# Patient Record
Sex: FEMALE | Race: BLACK OR AFRICAN AMERICAN | NOT HISPANIC OR LATINO | Employment: PART TIME | URBAN - METROPOLITAN AREA
[De-identification: names, ages, dates, MRNs, and addresses within clinical notes are randomized per-mention and may not be internally consistent; named-entity substitution may affect disease eponyms.]

---

## 2020-02-12 ENCOUNTER — OFFICE VISIT (OUTPATIENT)
Dept: OBSTETRICS AND GYNECOLOGY | Facility: CLINIC | Age: 25
End: 2020-02-12
Payer: MEDICAID

## 2020-02-12 ENCOUNTER — LAB VISIT (OUTPATIENT)
Dept: LAB | Facility: HOSPITAL | Age: 25
End: 2020-02-12
Attending: ADVANCED PRACTICE MIDWIFE
Payer: MEDICAID

## 2020-02-12 VITALS
DIASTOLIC BLOOD PRESSURE: 70 MMHG | HEIGHT: 66 IN | SYSTOLIC BLOOD PRESSURE: 108 MMHG | BODY MASS INDEX: 19.53 KG/M2 | WEIGHT: 121.5 LBS

## 2020-02-12 DIAGNOSIS — Z87.59 HISTORY OF PLACENTAL ABRUPTION: ICD-10-CM

## 2020-02-12 DIAGNOSIS — Z32.01 POSITIVE PREGNANCY TEST: Primary | ICD-10-CM

## 2020-02-12 DIAGNOSIS — Z32.01 POSITIVE PREGNANCY TEST: ICD-10-CM

## 2020-02-12 LAB
BASOPHILS # BLD AUTO: 0.04 K/UL (ref 0–0.2)
BASOPHILS NFR BLD: 0.4 % (ref 0–1.9)
DIFFERENTIAL METHOD: NORMAL
EOSINOPHIL # BLD AUTO: 0.1 K/UL (ref 0–0.5)
EOSINOPHIL NFR BLD: 1.4 % (ref 0–8)
ERYTHROCYTE [DISTWIDTH] IN BLOOD BY AUTOMATED COUNT: 11.9 % (ref 11.5–14.5)
HCT VFR BLD AUTO: 38.7 % (ref 37–48.5)
HGB BLD-MCNC: 12.7 G/DL (ref 12–16)
IMM GRANULOCYTES # BLD AUTO: 0.03 K/UL (ref 0–0.04)
IMM GRANULOCYTES NFR BLD AUTO: 0.3 % (ref 0–0.5)
LYMPHOCYTES # BLD AUTO: 1.9 K/UL (ref 1–4.8)
LYMPHOCYTES NFR BLD: 18.7 % (ref 18–48)
MCH RBC QN AUTO: 29.7 PG (ref 27–31)
MCHC RBC AUTO-ENTMCNC: 32.8 G/DL (ref 32–36)
MCV RBC AUTO: 90 FL (ref 82–98)
MONOCYTES # BLD AUTO: 0.8 K/UL (ref 0.3–1)
MONOCYTES NFR BLD: 8 % (ref 4–15)
NEUTROPHILS # BLD AUTO: 7.1 K/UL (ref 1.8–7.7)
NEUTROPHILS NFR BLD: 71.2 % (ref 38–73)
NRBC BLD-RTO: 0 /100 WBC
PLATELET # BLD AUTO: 267 K/UL (ref 150–350)
PMV BLD AUTO: 10.6 FL (ref 9.2–12.9)
RBC # BLD AUTO: 4.28 M/UL (ref 4–5.4)
WBC # BLD AUTO: 10.01 K/UL (ref 3.9–12.7)

## 2020-02-12 PROCEDURE — 99203 OFFICE O/P NEW LOW 30 MIN: CPT | Mod: S$PBB,TH,, | Performed by: ADVANCED PRACTICE MIDWIFE

## 2020-02-12 PROCEDURE — 86780 TREPONEMA PALLIDUM: CPT

## 2020-02-12 PROCEDURE — 86593 SYPHILIS TEST NON-TREP QUANT: CPT

## 2020-02-12 PROCEDURE — 99202 OFFICE O/P NEW SF 15 MIN: CPT | Mod: PBBFAC,TH,25 | Performed by: ADVANCED PRACTICE MIDWIFE

## 2020-02-12 PROCEDURE — 99999 PR PBB SHADOW E&M-NEW PATIENT-LVL II: ICD-10-PCS | Mod: PBBFAC,,, | Performed by: ADVANCED PRACTICE MIDWIFE

## 2020-02-12 PROCEDURE — 85025 COMPLETE CBC W/AUTO DIFF WBC: CPT

## 2020-02-12 PROCEDURE — 86592 SYPHILIS TEST NON-TREP QUAL: CPT

## 2020-02-12 PROCEDURE — 86762 RUBELLA ANTIBODY: CPT

## 2020-02-12 PROCEDURE — 99999 PR PBB SHADOW E&M-NEW PATIENT-LVL II: CPT | Mod: PBBFAC,,, | Performed by: ADVANCED PRACTICE MIDWIFE

## 2020-02-12 PROCEDURE — 86703 HIV-1/HIV-2 1 RESULT ANTBDY: CPT

## 2020-02-12 PROCEDURE — 36415 COLL VENOUS BLD VENIPUNCTURE: CPT

## 2020-02-12 PROCEDURE — 87340 HEPATITIS B SURFACE AG IA: CPT

## 2020-02-12 PROCEDURE — 99203 PR OFFICE/OUTPT VISIT, NEW, LEVL III, 30-44 MIN: ICD-10-PCS | Mod: S$PBB,TH,, | Performed by: ADVANCED PRACTICE MIDWIFE

## 2020-02-12 PROCEDURE — 86850 RBC ANTIBODY SCREEN: CPT

## 2020-02-12 PROCEDURE — 87591 N.GONORRHOEAE DNA AMP PROB: CPT

## 2020-02-12 PROCEDURE — 88175 CYTOPATH C/V AUTO FLUID REDO: CPT

## 2020-02-12 RX ORDER — HYDROXYPROGESTERONE CAPROATE 250 MG/ML
250 INJECTION INTRAMUSCULAR
Qty: 5 ML | Refills: 6 | Status: SHIPPED | OUTPATIENT
Start: 2020-02-12

## 2020-02-12 NOTE — PROGRESS NOTES
CHIEF COMPLAINT:   Patient presents with      Possible Pregnancy        HISTORY OF PRESENT ILLNESS  Buck Ingram 24 y.o.  presents for pregnancy risk assessment.   The patient has no complaints today.  No nausea or vomiting. No bleeding or pain.  Pregnancy was not planned but is desired.  Partner is supportive of pregnancy.  Lives at home with children.  No pets at home.  Works at home.  Denies domestic abuse.  Denies chemical/pesticide/radiation exposure.  OB history:      LMP: Patient's last menstrual period was 2019.  EDC: Estimated Date of Delivery: 20  EGA: 13w      Health Maintenance   Topic Date Due    Lipid Panel  1994    Pap Smear  2018    TETANUS VACCINE  2026    HPV Vaccines  Completed       Past Medical History:   Diagnosis Date    Anemia     Disorder of kidney and ureter        History reviewed. No pertinent surgical history.    Family History   Problem Relation Age of Onset    Lupus Mother     Breast cancer Neg Hx     Cancer Neg Hx     Colon cancer Neg Hx     Diabetes Neg Hx     Eclampsia Neg Hx     Hypertension Neg Hx     Miscarriages / Stillbirths Neg Hx     Ovarian cancer Neg Hx      labor Neg Hx     Stroke Neg Hx        Social History     Socioeconomic History    Marital status: Single     Spouse name: Not on file    Number of children: Not on file    Years of education: Not on file    Highest education level: Not on file   Occupational History    Not on file   Social Needs    Financial resource strain: Not on file    Food insecurity:     Worry: Not on file     Inability: Not on file    Transportation needs:     Medical: Not on file     Non-medical: Not on file   Tobacco Use    Smoking status: Never Smoker    Smokeless tobacco: Never Used   Substance and Sexual Activity    Alcohol use: No    Drug use: No    Sexual activity: Yes     Partners: Male     Birth control/protection: None   Lifestyle    Physical  activity:     Days per week: Not on file     Minutes per session: Not on file    Stress: Not on file   Relationships    Social connections:     Talks on phone: Not on file     Gets together: Not on file     Attends Holiness service: Not on file     Active member of club or organization: Not on file     Attends meetings of clubs or organizations: Not on file     Relationship status: Not on file   Other Topics Concern    Not on file   Social History Narrative    Not on file       Current Outpatient Medications   Medication Sig Dispense Refill    hydroxyprogesterone caproate (17-ALPHA HYDROXYPROGESTERONE) injection Inject 1 mL (250 mg total) into the muscle every 7 days. 5 mL 6    PRENATAL #92-IRON-FA #8-PS-DHA ORAL Take by mouth.       No current facility-administered medications for this visit.        Review of patient's allergies indicates:  No Known Allergies      PHYSICAL EXAM   Vitals:    02/12/20 1425   BP: 108/70        PAIN SCALE: 0/10 None    PHYSICAL EXAM    ROS:  GENERAL: No fever, chills, fatigability or weight loss.  CV: Denies chest pain  PULM: Denies shortness of breath or wheezing.  ABDOMEN: Appetite fine. No weight loss. Denies diarrhea, abdominal pain, hematemesis or blood in stool.  URINARY: No flank pain, dysuria or hematuria.  REPRODUCTIVE: No abnormal vaginal bleeding.       PE:   APPEARANCE: Well nourished, well developed, in no acute distress  CHEST: Clear to auscultation bilaterally  CV: Regular rate and rhythm  BREASTS: Symmetrical, no skin changes or visible lesions. No palpable masses, nipple discharge or adenopathy bilaterally.  ABDOMEN: Soft. No tenderness or masses. No hepatosplenomegaly. No hernias  PELVIC:   VULVA: No lesions. Normal female genitalia.  URETHRAL MEATUS: Normal size and location, no lesions, no prolapse.  URETHRA: No masses, tenderness, prolapse or scarring.  VAGINA: Moist and well rugated, no discharge, no significant cystocele or rectocele.  CERVIX: No lesions,  normal diameter, no stenosis, no cervical motion tenderness.   UTERUS: 13size, regular shape, mobile, non-tender, normal position, good support.  ADNEXA: No masses, tenderness or CDS nodularity.  ANUS PERINEUM: No lesions, no relaxation, no external hemorrhoids.     UPT +     A/P:     -      Patient was counseled today on A.C.S. Pap guidelines and recommendations for yearly pelvic exams, mammograms and monthly self breast exams; to see her PCP for other health maintenance and pregnancy.   -      Patient's medications and medical history reviewed with patient and implications in pregnancy.   -      Pregnancy course discussed and 'AtoZ' book given. Patient was counseled on proper weight gain based on the [place] [employer]'s recommendations based on her pre-pregnancy weight. Discussed foods to avoid in pregnancy (i.e. sushi, fish that are high in mercury, deli meat, and unpasteurized cheeses). Discussed prenatal vitamin options (i.e. stool softener, DHA). Discussed potential medical problems in pregnancy.  -     Discussed risk of Toxoplasmosis transmission from pets and reviewed risk reduction techniques.    -     Chromosomal abnormality risk discussed and available testing offered - declined .   -     Pt was counseled on exercise in pregnancy and weight gain recommendations.  -     Pt was counseled on travel recommendations and on risks of Zika virus exposure.  Current CDC Zika advisories and prevention techniques were reviewed with pt.  Pt denies any recent international travel and does not plan travel during pregnancy.  Pt reports that partner does not plan travel either.  -     Oriented to practice including CNM collaboration.   -     Follow-up initial OB, with labs and u/s.   17 OHP ordered  sono ordered

## 2020-02-13 LAB
ABO + RH BLD: NORMAL
BLD GP AB SCN CELLS X3 SERPL QL: NORMAL

## 2020-02-14 LAB
HBV SURFACE AG SERPL QL IA: NEGATIVE
HIV 1+2 AB+HIV1 P24 AG SERPL QL IA: NEGATIVE
RPR SER QL: REACTIVE
RPR SER-TITR: ABNORMAL {TITER}
RUBV IGG SER-ACNC: 77.3 IU/ML
RUBV IGG SER-IMP: REACTIVE

## 2020-02-15 LAB
C TRACH DNA SPEC QL NAA+PROBE: NOT DETECTED
N GONORRHOEA DNA SPEC QL NAA+PROBE: NOT DETECTED

## 2020-02-17 ENCOUNTER — TELEPHONE (OUTPATIENT)
Dept: PHARMACY | Facility: CLINIC | Age: 25
End: 2020-02-17

## 2020-02-17 LAB — T PALLIDUM AB SER QL IF: REACTIVE

## 2020-02-17 NOTE — TELEPHONE ENCOUNTER
No answer/VM to inform patient that [facility] received prescription for Renee and benefits investigation is required.  [facility] will be back in touch once insurance determination is received.

## 2020-02-18 ENCOUNTER — INITIAL PRENATAL (OUTPATIENT)
Dept: OBSTETRICS AND GYNECOLOGY | Facility: CLINIC | Age: 25
End: 2020-02-18
Payer: MEDICAID

## 2020-02-18 ENCOUNTER — PROCEDURE VISIT (OUTPATIENT)
Dept: OBSTETRICS AND GYNECOLOGY | Facility: CLINIC | Age: 25
End: 2020-02-18
Payer: MEDICAID

## 2020-02-18 VITALS
BODY MASS INDEX: 19.32 KG/M2 | DIASTOLIC BLOOD PRESSURE: 60 MMHG | SYSTOLIC BLOOD PRESSURE: 110 MMHG | WEIGHT: 119.69 LBS

## 2020-02-18 DIAGNOSIS — Z32.01 POSITIVE PREGNANCY TEST: ICD-10-CM

## 2020-02-18 DIAGNOSIS — O09.91 SUPERVISION OF HIGH RISK PREGNANCY IN FIRST TRIMESTER: ICD-10-CM

## 2020-02-18 DIAGNOSIS — O09.899 HISTORY OF PRETERM DELIVERY, CURRENTLY PREGNANT: Primary | ICD-10-CM

## 2020-02-18 PROCEDURE — 76801 PR US, OB <14WKS, TRANSABD, SINGLE GESTATION: ICD-10-PCS | Mod: 26,S$PBB,, | Performed by: OBSTETRICS & GYNECOLOGY

## 2020-02-18 PROCEDURE — 99213 OFFICE O/P EST LOW 20 MIN: CPT | Mod: TH,S$PBB,, | Performed by: ADVANCED PRACTICE MIDWIFE

## 2020-02-18 PROCEDURE — 99213 PR OFFICE/OUTPT VISIT, EST, LEVL III, 20-29 MIN: ICD-10-PCS | Mod: TH,S$PBB,, | Performed by: ADVANCED PRACTICE MIDWIFE

## 2020-02-18 PROCEDURE — 99999 PR PBB SHADOW E&M-EST. PATIENT-LVL II: CPT | Mod: PBBFAC,,, | Performed by: ADVANCED PRACTICE MIDWIFE

## 2020-02-18 PROCEDURE — 99212 OFFICE O/P EST SF 10 MIN: CPT | Mod: PBBFAC,TH | Performed by: ADVANCED PRACTICE MIDWIFE

## 2020-02-18 PROCEDURE — 76801 OB US < 14 WKS SINGLE FETUS: CPT | Mod: 26,S$PBB,, | Performed by: OBSTETRICS & GYNECOLOGY

## 2020-02-18 PROCEDURE — 76801 OB US < 14 WKS SINGLE FETUS: CPT | Mod: PBBFAC | Performed by: OBSTETRICS & GYNECOLOGY

## 2020-02-18 PROCEDURE — 99999 PR PBB SHADOW E&M-EST. PATIENT-LVL II: ICD-10-PCS | Mod: PBBFAC,,, | Performed by: ADVANCED PRACTICE MIDWIFE

## 2020-02-18 RX ORDER — ONDANSETRON 4 MG/1
4 TABLET, FILM COATED ORAL DAILY PRN
Qty: 30 TABLET | Refills: 1 | Status: SHIPPED | OUTPATIENT
Start: 2020-02-18 | End: 2021-02-17
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
## 2020-03-12 LAB
FINAL PATHOLOGIC DIAGNOSIS: NORMAL
Lab: NORMAL

## 2020-03-14 DIAGNOSIS — O23.592 TRICHOMONAL VAGINITIS DURING PREGNANCY IN SECOND TRIMESTER: Primary | ICD-10-CM

## 2020-03-14 DIAGNOSIS — A59.01 TRICHOMONAL VAGINITIS DURING PREGNANCY IN SECOND TRIMESTER: Primary | ICD-10-CM

## 2020-03-14 RX ORDER — METRONIDAZOLE 500 MG/1
2000 TABLET ORAL ONCE
Qty: 4 TABLET | Refills: 0 | Status: SHIPPED | OUTPATIENT
Start: 2020-03-14 | End: 2020-03-14

## 2020-03-16 ENCOUNTER — TELEPHONE (OUTPATIENT)
Dept: OBSTETRICS AND GYNECOLOGY | Facility: CLINIC | Age: 25
End: 2020-03-16

## 2020-03-16 NOTE — TELEPHONE ENCOUNTER
Attempted to contact patient regarding her test results and interventions. No answer, unable to leave vm due to vm not set up. Will attempt later

## 2020-03-17 ENCOUNTER — TELEPHONE (OUTPATIENT)
Dept: OBSTETRICS AND GYNECOLOGY | Facility: CLINIC | Age: 25
End: 2020-03-17

## 2020-03-18 ENCOUNTER — TELEPHONE (OUTPATIENT)
Dept: OBSTETRICS AND GYNECOLOGY | Facility: CLINIC | Age: 25
End: 2020-03-18

## 2020-03-18 NOTE — TELEPHONE ENCOUNTER
----- Message from Robyn Richards CNM sent at 3/14/2020 10:39 AM CDT -----  Regarding: Labs  Please call Ms. Ingram and let her know she tested + for trich on her PAP. Prescription sent to her pharmacy. Instruct no sex until partner is treated as well. Thanks!

## 2020-03-18 NOTE — TELEPHONE ENCOUNTER
Spoke to pt regarding her pap indicates she is positive for trich. Advised pt to  her prescription and take as prescribed. In addition, advised her to have her partner treated as well and to refrain from intercourse until she has been cleared. Pt's phone became choppy and disconnected. Attempted to call pt back. No answer, unable to leave voicemail. Will try later.

## 2020-03-31 ENCOUNTER — TELEPHONE (OUTPATIENT)
Dept: OBSTETRICS AND GYNECOLOGY | Facility: CLINIC | Age: 25
End: 2020-03-31

## 2020-03-31 NOTE — TELEPHONE ENCOUNTER
----- Message from Cristal Bush sent at 3/31/2020 10:49 AM CDT -----  Contact: Thi carmen/ office of public health   Caller request call back to verify if pt received treatment  ... Call back : [phone]

## 2020-03-31 NOTE — TELEPHONE ENCOUNTER
Spoke with Thi Perez from office of public health,she was calling to find out if Mrs. Ingram received her treatment for syphilis on 3/19/20. Patient did not show up for this apt and I don't see where she was treated.

## 2020-04-14 ENCOUNTER — TELEPHONE (OUTPATIENT)
Dept: OBSTETRICS AND GYNECOLOGY | Facility: CLINIC | Age: 25
End: 2020-04-14

## 2020-04-14 DIAGNOSIS — Z36.89 ENCOUNTER FOR ROUTINE FETAL ULTRASOUND: Primary | ICD-10-CM

## 2020-04-14 NOTE — TELEPHONE ENCOUNTER
Called patient and scheduled OB appointment.  Per notes patient was supposed to be treated for syphilis and never came for injection.  Strongly encouraged patient to keep appointment on Friday and she verbalized understanding.

## 2020-04-14 NOTE — TELEPHONE ENCOUNTER
----- Message from Gerson Barrios sent at 4/14/2020 11:46 AM CDT -----  Contact: pt  Type:  Patient Returning Call    Who Called: KRIS NICOLAS   Who Left Message for Patient: nurse  Does the patient know what this is regarding?:  Would the patient rather a call back or a response via [facility]? call  Best Call Back Number: [phone] (home)   Additional Information:

## 2020-04-17 ENCOUNTER — PROCEDURE VISIT (OUTPATIENT)
Dept: OBSTETRICS AND GYNECOLOGY | Facility: CLINIC | Age: 25
End: 2020-04-17
Payer: MEDICAID

## 2020-04-17 ENCOUNTER — ROUTINE PRENATAL (OUTPATIENT)
Dept: OBSTETRICS AND GYNECOLOGY | Facility: CLINIC | Age: 25
End: 2020-04-17
Payer: MEDICAID

## 2020-04-17 VITALS
DIASTOLIC BLOOD PRESSURE: 80 MMHG | BODY MASS INDEX: 20.96 KG/M2 | WEIGHT: 129.88 LBS | SYSTOLIC BLOOD PRESSURE: 118 MMHG

## 2020-04-17 DIAGNOSIS — O09.899 HISTORY OF PRETERM DELIVERY, CURRENTLY PREGNANT: Primary | ICD-10-CM

## 2020-04-17 DIAGNOSIS — O09.92 SUPERVISION OF HIGH RISK PREGNANCY IN SECOND TRIMESTER: ICD-10-CM

## 2020-04-17 DIAGNOSIS — O98.112 SYPHILIS AFFECTING PREGNANCY IN SECOND TRIMESTER: ICD-10-CM

## 2020-04-17 DIAGNOSIS — A59.9 TRICHOMONIASIS: ICD-10-CM

## 2020-04-17 DIAGNOSIS — Z36.89 ENCOUNTER FOR ROUTINE FETAL ULTRASOUND: ICD-10-CM

## 2020-04-17 PROCEDURE — 76805 OB US >/= 14 WKS SNGL FETUS: CPT | Mod: PBBFAC | Performed by: OBSTETRICS & GYNECOLOGY

## 2020-04-17 PROCEDURE — 99999 PR PBB SHADOW E&M-EST. PATIENT-LVL II: ICD-10-PCS | Mod: PBBFAC,,, | Performed by: ADVANCED PRACTICE MIDWIFE

## 2020-04-17 PROCEDURE — 99213 OFFICE O/P EST LOW 20 MIN: CPT | Mod: TH,S$PBB,, | Performed by: ADVANCED PRACTICE MIDWIFE

## 2020-04-17 PROCEDURE — 76805 PR US, OB 14+WKS, TRANSABD, SINGLE GESTATION: ICD-10-PCS | Mod: 26,S$PBB,, | Performed by: OBSTETRICS & GYNECOLOGY

## 2020-04-17 PROCEDURE — 99999 PR PBB SHADOW E&M-EST. PATIENT-LVL II: CPT | Mod: PBBFAC,,, | Performed by: ADVANCED PRACTICE MIDWIFE

## 2020-04-17 PROCEDURE — 99213 PR OFFICE/OUTPT VISIT, EST, LEVL III, 20-29 MIN: ICD-10-PCS | Mod: TH,S$PBB,, | Performed by: ADVANCED PRACTICE MIDWIFE

## 2020-04-17 PROCEDURE — 99212 OFFICE O/P EST SF 10 MIN: CPT | Mod: PBBFAC,TH,25 | Performed by: ADVANCED PRACTICE MIDWIFE

## 2020-04-17 PROCEDURE — 76805 OB US >/= 14 WKS SNGL FETUS: CPT | Mod: 26,S$PBB,, | Performed by: OBSTETRICS & GYNECOLOGY

## 2020-04-17 RX ORDER — METRONIDAZOLE 500 MG/1
500 TABLET ORAL EVERY 12 HOURS
Qty: 14 TABLET | Refills: 0 | Status: SHIPPED | OUTPATIENT
Start: 2020-04-17 | End: 2020-04-24

## 2020-04-17 RX ORDER — HYDROXYPROGESTERONE CAPROATE 1250 MG/5ML
250 INJECTION INTRAMUSCULAR
Status: COMPLETED | OUTPATIENT
Start: 2020-04-17 | End: 2020-04-17

## 2020-04-17 RX ADMIN — HYDROXYPROGESTERONE CAPROATE 250 MG: 1250 INJECTION INTRAMUSCULAR at 12:04

## 2020-04-17 RX ADMIN — PENICILLIN G BENZATHINE 1.2 MILLION UNITS: 1200000 INJECTION, SUSPENSION INTRAMUSCULAR at 12:04

## 2020-04-17 NOTE — NURSING
Verified pt with two identifiers name and . Allergies and medications reviewed. Bicillin 1.2 mill units administered IM to left ventrogluteal, Bicillin 1.2 mill units administered IM to right ventrogluteal while using aseptic technique. No discomfort noted. Pt tolerated well. Advised pt to wait 15 minutes in the lobby to monitor for side effects. Next scheduled injection is next 20. Pt verbalized understanding.  [place] administered to left ventroglueal.

## 2020-04-17 NOTE — PROGRESS NOTES
Per health department Patient needs to be retreated for syphillis x 2 doses  Will begin today  Needs shital Has not been keeping appointments Long talk about importance of keeping appointments  Needs trich treatment never picked up RX resent to pharmacy  sono done male,posterior placenta ,normal anatomy  Begin [place] today

## 2020-04-24 ENCOUNTER — ROUTINE PRENATAL (OUTPATIENT)
Dept: OBSTETRICS AND GYNECOLOGY | Facility: CLINIC | Age: 25
End: 2020-04-24
Payer: MEDICAID

## 2020-04-24 VITALS
SYSTOLIC BLOOD PRESSURE: 120 MMHG | DIASTOLIC BLOOD PRESSURE: 78 MMHG | WEIGHT: 130.94 LBS | BODY MASS INDEX: 21.14 KG/M2

## 2020-04-24 DIAGNOSIS — Z34.92 NORMAL PREGNANCY IN SECOND TRIMESTER: Primary | ICD-10-CM

## 2020-04-24 DIAGNOSIS — O98.112 SYPHILIS AFFECTING PREGNANCY, ANTEPARTUM, SECOND TRIMESTER: ICD-10-CM

## 2020-04-24 PROCEDURE — 99212 OFFICE O/P EST SF 10 MIN: CPT | Mod: PBBFAC,TH | Performed by: ADVANCED PRACTICE MIDWIFE

## 2020-04-24 PROCEDURE — 99212 OFFICE O/P EST SF 10 MIN: CPT | Mod: TH,S$PBB,, | Performed by: ADVANCED PRACTICE MIDWIFE

## 2020-04-24 PROCEDURE — 99212 PR OFFICE/OUTPT VISIT, EST, LEVL II, 10-19 MIN: ICD-10-PCS | Mod: TH,S$PBB,, | Performed by: ADVANCED PRACTICE MIDWIFE

## 2020-04-24 PROCEDURE — 99999 PR PBB SHADOW E&M-EST. PATIENT-LVL II: CPT | Mod: PBBFAC,,, | Performed by: ADVANCED PRACTICE MIDWIFE

## 2020-04-24 PROCEDURE — 99999 PR PBB SHADOW E&M-EST. PATIENT-LVL II: ICD-10-PCS | Mod: PBBFAC,,, | Performed by: ADVANCED PRACTICE MIDWIFE

## 2020-04-24 RX ORDER — HYDROXYPROGESTERONE CAPROATE 1250 MG/5ML
250 INJECTION INTRAMUSCULAR
Status: COMPLETED | OUTPATIENT
Start: 2020-04-24 | End: 2020-04-24

## 2020-04-24 RX ADMIN — HYDROXYPROGESTERONE CAPROATE 250 MG: 1250 INJECTION INTRAMUSCULAR at 09:04

## 2020-04-24 RX ADMIN — PENICILLIN G BENZATHINE 1.2 MILLION UNITS: 1200000 INJECTION, SUSPENSION INTRAMUSCULAR at 10:04

## 2020-04-24 NOTE — NURSING
Verified pt with two identifiers name and . Allergies and medications reviewed. Bicillin 1.2 mill units administered IM to right ventrogluteal, Bicillin 1.2 mill units administered IM to left ventrogluteal while using aseptic technique. No discomfort noted. Pt tolerated well. Advised pt to wait 15 minutes in the lobby to monitor for side effects. Next scheduled injection is next 20. Pt verbalized understanding. Renee administered to right ventroglueal.

## 2020-05-01 ENCOUNTER — ROUTINE PRENATAL (OUTPATIENT)
Dept: OBSTETRICS AND GYNECOLOGY | Facility: CLINIC | Age: 25
End: 2020-05-01
Payer: MEDICAID

## 2020-05-01 VITALS
BODY MASS INDEX: 20.64 KG/M2 | SYSTOLIC BLOOD PRESSURE: 106 MMHG | WEIGHT: 127.88 LBS | DIASTOLIC BLOOD PRESSURE: 64 MMHG

## 2020-05-01 DIAGNOSIS — O98.112 SYPHILIS AFFECTING PREGNANCY, ANTEPARTUM, SECOND TRIMESTER: ICD-10-CM

## 2020-05-01 DIAGNOSIS — Z87.51 HISTORY OF PRETERM LABOR: ICD-10-CM

## 2020-05-01 DIAGNOSIS — Z34.92 NORMAL PREGNANCY IN SECOND TRIMESTER: Primary | ICD-10-CM

## 2020-05-01 PROCEDURE — 99999 PR PBB SHADOW E&M-EST. PATIENT-LVL II: ICD-10-PCS | Mod: PBBFAC,,, | Performed by: ADVANCED PRACTICE MIDWIFE

## 2020-05-01 PROCEDURE — 99212 PR OFFICE/OUTPT VISIT, EST, LEVL II, 10-19 MIN: ICD-10-PCS | Mod: TH,S$PBB,, | Performed by: ADVANCED PRACTICE MIDWIFE

## 2020-05-01 PROCEDURE — 99212 OFFICE O/P EST SF 10 MIN: CPT | Mod: PBBFAC,TH | Performed by: ADVANCED PRACTICE MIDWIFE

## 2020-05-01 PROCEDURE — 99999 PR PBB SHADOW E&M-EST. PATIENT-LVL II: CPT | Mod: PBBFAC,,, | Performed by: ADVANCED PRACTICE MIDWIFE

## 2020-05-01 PROCEDURE — 99212 OFFICE O/P EST SF 10 MIN: CPT | Mod: TH,S$PBB,, | Performed by: ADVANCED PRACTICE MIDWIFE

## 2020-05-01 RX ORDER — HYDROXYPROGESTERONE CAPROATE 1250 MG/5ML
250 INJECTION INTRAMUSCULAR
Status: ACTIVE | OUTPATIENT
Start: 2020-05-01 | End: 2020-07-10

## 2020-05-01 RX ADMIN — PENICILLIN G BENZATHINE 1.2 MILLION UNITS: 1200000 INJECTION, SUSPENSION INTRAMUSCULAR at 11:05

## 2020-05-01 RX ADMIN — HYDROXYPROGESTERONE CAPROATE 250 MG: 1250 INJECTION INTRAMUSCULAR at 11:05

## 2020-05-01 NOTE — NURSING
Verified pt with two identifiers name and . Allergies and medications reviewed. Bicillin 1.2 mill units administered IM to left ventrogluteal, Bicillin 1.2 mill units administered IM to right ventrogluteal while using aseptic technique. No discomfort noted. Pt tolerated well. Advised pt to wait 15 minutes in the lobby to monitor for side effects. Pt verbalized understanding.  [place] administered to left ventroglueal. Next scheduled injection is next 20.

## 2020-05-01 NOTE — PROGRESS NOTES
Bicillin 2.4 million units  [place] 250mg IM x1   28 week labs next visit  No complaints weight gain encouraged

## 2021-08-16 NOTE — TELEPHONE ENCOUNTER
Patient reached in regards to Renee approval - $0 copay. She confirms two patient identifiers - name + . She is ~ 18 weeks GA now and needs to get started on treatment right away but she is concerned with coming to appts weekly for injections, considering the COVID-19 outbreak. She grants [facility] consent to deliver her first fill - she has an appt on 3/20/20 at 1:15pm and would like to see if she can get her first injection in office and providers approval to self-inject at home. Msg sent to provider to confirm if she can set up home nursing visits or michel consent to self inject at home after provider shows her how in office AND to confirm delivery address of first fill. DANIEL   no